# Patient Record
Sex: FEMALE | Race: WHITE | NOT HISPANIC OR LATINO
[De-identification: names, ages, dates, MRNs, and addresses within clinical notes are randomized per-mention and may not be internally consistent; named-entity substitution may affect disease eponyms.]

---

## 2024-09-18 ENCOUNTER — DASHBOARD ENCOUNTERS (OUTPATIENT)
Age: 24
End: 2024-09-18

## 2024-09-18 ENCOUNTER — OFFICE VISIT (OUTPATIENT)
Dept: URBAN - NONMETROPOLITAN AREA CLINIC 4 | Facility: CLINIC | Age: 24
End: 2024-09-18
Payer: COMMERCIAL

## 2024-09-18 VITALS
SYSTOLIC BLOOD PRESSURE: 104 MMHG | HEART RATE: 94 BPM | BODY MASS INDEX: 36.32 KG/M2 | HEIGHT: 60 IN | TEMPERATURE: 98.2 F | DIASTOLIC BLOOD PRESSURE: 74 MMHG | WEIGHT: 185 LBS

## 2024-09-18 DIAGNOSIS — R79.89 ABNORMAL LFTS: ICD-10-CM

## 2024-09-18 DIAGNOSIS — E66.01 CLASS 2 SEVERE OBESITY DUE TO EXCESS CALORIES WITH SERIOUS COMORBIDITY IN ADULT, UNSPECIFIED BMI: ICD-10-CM

## 2024-09-18 PROBLEM — 443381000124105: Status: ACTIVE | Noted: 2024-09-18

## 2024-09-18 PROCEDURE — 99204 OFFICE O/P NEW MOD 45 MIN: CPT | Performed by: INTERNAL MEDICINE

## 2024-09-18 RX ORDER — PREDNISONE 20 MG/1
2 TABLETS 13 HOURS, 7 HOURS, AND 1 HOUR BEFORE CONTRAST MEDIA INJECTION TABLET ORAL ONCE A DAY
Status: ACTIVE | COMMUNITY

## 2024-09-18 RX ORDER — NORGESTREL AND ETHINYL ESTRADIOL 0.3-0.03MG
1 TABLET KIT ORAL ONCE A DAY
Status: ACTIVE | COMMUNITY

## 2024-09-18 RX ORDER — AMOXICILLIN AND CLAVULANATE POTASSIUM 875; 125 MG/1; MG/1
1 TABLET TABLET, FILM COATED ORAL
Status: ACTIVE | COMMUNITY

## 2024-09-18 RX ORDER — CEFPROZIL 500 MG/1
1 TABLET TABLET, FILM COATED ORAL ONCE A DAY
Status: ACTIVE | COMMUNITY

## 2024-09-18 NOTE — HPI-TODAY'S VISIT:
Patient is a 23 yo woman referred by Dr. Estevan Edgar for above reasons. A copy of this document will be sent to referring provider. She was recently diagnosed with abnormal LFTs: [id number]>> 209, >>336, >>227, AL 4.0, TB 8.1>> 2.7, . She had clinical jaundice and dark urine. No abdominal pain, feverNo family history of liver disease. No personal history of liver disease. She has no symptoms of CLD. She denies use of alcohol. Risk factors for liver disease include recently diagnosed HLD; , TG 3.14, HDL 45 and obesity. USG showed fatty liver. She has been on 3 rounds of antibiotics for sinus infections (Augementin in June, Cefprozil in Aug 2024). No use of MJ. She has never had a liver biopsy.

## 2024-09-20 LAB
ALBUMIN: 4.5
ALKALINE PHOSPHATASE: 135
ALT (SGPT): 211
ANA DIRECT: NEGATIVE
AST (SGOT): 115
BILIRUBIN, TOTAL: 1.3
BUN/CREATININE RATIO: 19
BUN: 16
CALCIUM: 10.5
CARBON DIOXIDE, TOTAL: 22
CHLORIDE: 102
CREATININE: 0.83
EGFR: 102
GGT: 137
GLOBULIN, TOTAL: 2.7
GLUCOSE: 84
HEPATITIS B SURF AB QUANT: <3.5
Lab: (no result)
MITOCHONDRIAL (M2) ANTIBODY: <20
POTASSIUM: 4.5
PROTEIN, TOTAL: 7.2
SODIUM: 139

## 2025-03-19 ENCOUNTER — OFFICE VISIT (OUTPATIENT)
Dept: URBAN - NONMETROPOLITAN AREA CLINIC 4 | Facility: CLINIC | Age: 25
End: 2025-03-19
Payer: COMMERCIAL

## 2025-03-19 VITALS — TEMPERATURE: 98.3 F | BODY MASS INDEX: 35.06 KG/M2 | HEIGHT: 60 IN | WEIGHT: 178.6 LBS

## 2025-03-19 DIAGNOSIS — R79.89 ABNORMAL LFTS: ICD-10-CM

## 2025-03-19 DIAGNOSIS — E78.00 ELEVATED CHOLESTEROL: ICD-10-CM

## 2025-03-19 DIAGNOSIS — E66.811 CLASS 1 OBESITY: ICD-10-CM

## 2025-03-19 DIAGNOSIS — K76.0 HEPATIC STEATOSIS: ICD-10-CM

## 2025-03-19 PROBLEM — 197321007: Status: ACTIVE | Noted: 2025-03-19

## 2025-03-19 PROCEDURE — 99214 OFFICE O/P EST MOD 30 MIN: CPT | Performed by: REGISTERED NURSE

## 2025-03-19 RX ORDER — PREDNISONE 20 MG/1
2 TABLETS 13 HOURS, 7 HOURS, AND 1 HOUR BEFORE CONTRAST MEDIA INJECTION TABLET ORAL ONCE A DAY
Status: DISCONTINUED | COMMUNITY

## 2025-03-19 RX ORDER — NORETHINDRONE 0.35 MG/1
TAKE ONE TABLET BY MOUTH ONE TIME DAILY TABLET ORAL
Qty: 84 UNSPECIFIED | Refills: 2 | Status: ACTIVE | COMMUNITY

## 2025-03-19 RX ORDER — NORGESTREL AND ETHINYL ESTRADIOL 0.3-0.03MG
1 TABLET KIT ORAL ONCE A DAY
Status: DISCONTINUED | COMMUNITY

## 2025-03-19 RX ORDER — BUPROPION HYDROCHLORIDE 150 MG/1
TAKE ONE TABLET BY MOUTH ONE TIME DAILY IN THE MORNING DO NOT CRUSH, CHEW, OR SPLIT TABLET, EXTENDED RELEASE ORAL
Qty: 90 UNSPECIFIED | Refills: 0 | Status: ACTIVE | COMMUNITY

## 2025-03-19 RX ORDER — CEFPROZIL 500 MG/1
1 TABLET TABLET, FILM COATED ORAL ONCE A DAY
Status: DISCONTINUED | COMMUNITY

## 2025-03-19 RX ORDER — LISDEXAMFETAMINE DIMESYLATE 20 MG/1
CAPSULE ORAL
Qty: 30 CAPSULE | Status: ACTIVE | COMMUNITY

## 2025-03-19 RX ORDER — AMOXICILLIN AND CLAVULANATE POTASSIUM 875; 125 MG/1; MG/1
1 TABLET TABLET, FILM COATED ORAL
Status: DISCONTINUED | COMMUNITY

## 2025-03-19 RX ORDER — LISDEXAMFETAMINE DIMESYLATE 30 MG/1
TABLET, CHEWABLE ORAL
Qty: 30 TABLET | Status: ACTIVE | COMMUNITY

## 2025-03-19 RX ORDER — MONTELUKAST SODIUM 10 MG/1
TAKE ONE TABLET BY MOUTH EVERY NIGHT TABLET, FILM COATED ORAL
Qty: 30 UNSPECIFIED | Refills: 0 | Status: ACTIVE | COMMUNITY

## 2025-03-19 NOTE — HPI-TODAY'S VISIT:
Patient is a 25 yo woman referred by Dr. Estevan Edgar for above reasons. A copy of this document will be sent to referring provider. She was recently diagnosed with abnormal LFTs: [id number]>> 209, >>336, >>227, AL 4.0, TB 8.1>> 2.7, . She had clinical jaundice and dark urine. No abdominal pain, feverNo family history of liver disease. No personal history of liver disease. She has no symptoms of CLD. She denies use of alcohol. Risk factors for liver disease include recently diagnosed HLD; , TG 3.14, HDL 45 and obesity. USG showed fatty liver. She has been on 3 rounds of antibiotics for sinus infections (Augementin in June, Cefprozil in Aug 2024). No use of MJ. She has never had a liver biopsy.  Follow Up 3/19/25: Pt RTC for routine follow up. States she has modified her diet and increased physical activity. She was 195 lbs and got down to 175 lbs. Denies any GI complaints.

## 2025-03-20 PROBLEM — 13644009: Status: ACTIVE | Noted: 2025-03-20

## 2025-03-20 LAB
ALBUMIN/GLOBULIN RATIO: 1.7
ALBUMIN: 4.5
ALKALINE PHOSPHATASE: 82
ALT: 20
AST: 17
BILIRUBIN, DIRECT: 0.1
BILIRUBIN, INDIRECT: 0.3
BILIRUBIN, TOTAL: 0.4
FIB 4 INDEX: 0.2
FIB 4 INTERPRETATION: (no result)
GLOBULIN: 2.6
PLATELET COUNT: 419
PROTEIN, TOTAL: 7.1